# Patient Record
Sex: MALE | Race: WHITE | Employment: FULL TIME | URBAN - METROPOLITAN AREA
[De-identification: names, ages, dates, MRNs, and addresses within clinical notes are randomized per-mention and may not be internally consistent; named-entity substitution may affect disease eponyms.]

---

## 2017-05-26 ENCOUNTER — OFFICE VISIT (OUTPATIENT)
Dept: URGENT CARE | Facility: RETAIL CLINIC | Age: 22
End: 2017-05-26
Payer: COMMERCIAL

## 2017-05-26 VITALS — DIASTOLIC BLOOD PRESSURE: 69 MMHG | HEART RATE: 71 BPM | SYSTOLIC BLOOD PRESSURE: 130 MMHG | TEMPERATURE: 98.4 F

## 2017-05-26 DIAGNOSIS — L23.7 CONTACT DERMATITIS DUE TO POISON IVY: Primary | ICD-10-CM

## 2017-05-26 PROCEDURE — 99202 OFFICE O/P NEW SF 15 MIN: CPT | Performed by: NURSE PRACTITIONER

## 2017-05-26 RX ORDER — PREDNISONE 20 MG/1
TABLET ORAL
Qty: 21 TABLET | Refills: 0 | Status: SHIPPED | OUTPATIENT
Start: 2017-05-26 | End: 2021-08-11

## 2017-05-26 RX ORDER — TRIAMCINOLONE ACETONIDE 1 MG/G
OINTMENT TOPICAL
Qty: 80 G | Refills: 0 | Status: SHIPPED | OUTPATIENT
Start: 2017-05-26 | End: 2021-08-11

## 2017-05-26 NOTE — MR AVS SNAPSHOT
"              After Visit Summary   2017    Lawrence Engel    MRN: [id number]           Patient Information     Date Of Birth          1995        Visit Information        Provider Department      2017 4:30 PM Bhaskar Estrada APRN [facility]        Today's Diagnoses     Contact dermatitis due to poison ivy    -  1       Follow-ups after your visit        Who to contact     You can reach your care team any time of the day by calling [phone].  Notification of test results:  If you have an abnormal lab result, we will notify you by phone as soon as possible.         Additional Information About Your Visit        MyChart Information     [employer] lets you send messages to your doctor, view your test results, renew your prescriptions, schedule appointments and more. To sign up, go to [email or web address] . Click on \"Log in\" on the left side of the screen, which will take you to the Welcome page. Then click on \"Sign up Now\" on the right side of the page.     You will be asked to enter the access code listed below, as well as some personal information. Please follow the directions to create your username and password.     Your access code is: [email or web address]  Expires: 2017  4:45 PM     Your access code will  in 90 days. If you need help or a new code, please call your [place] clinic or [phone].        Care EveryWhere ID     This is your [facility] EveryWhere ID. This could be used by other organizations to access your [place] medical records  [id number]        Your Vitals Were     Pulse Temperature                71 98.4  F (36.9  C) (Oral)           Blood Pressure from Last 3 Encounters:   17 130/69   03/11/15 118/69    Weight from Last 3 Encounters:   03/11/15 161 lb (73 kg) (59 %)*     * Growth percentiles are based on CDC 2-20 Years data.              Today, you had the following     No orders found for display         Today's Medication Changes        "   These changes are accurate as of: 5/26/17  4:45 PM.  If you have any questions, ask your nurse or doctor.               Start taking these medicines.        Dose/Directions    predniSONE 20 MG tablet   Commonly known as:  DELTASONE   Used for:  Contact dermatitis due to poison ivy        Take 3 tablets for 3 days, then take 2 tablets for 3 days, then 1 tablet for 4 days, then 1/2 a tablet for 4 days.   Quantity:  21 tablet   Refills:  0       triamcinolone 0.1 % ointment   Commonly known as:  KENALOG   Used for:  Contact dermatitis due to poison ivy        Apply sparingly to affected area three times daily for 14 days.   Quantity:  80 g   Refills:  0            Where to get your medicines      These medications were sent to [facility] - [address]  [address], [facility] [address]     Phone:  [phone]     predniSONE 20 MG tablet         Some of these will need a paper prescription and others can be bought over the counter.  Ask your nurse if you have questions.     Bring a paper prescription for each of these medications     triamcinolone 0.1 % ointment                Primary Care Provider    None Specified       No primary provider on file.        Thank you!     Thank you for choosing [facility]  for your care. Our goal is always to provide you with excellent care. Hearing back from our patients is one way we can continue to improve our services. Please take a few minutes to complete the written survey that you may receive in the mail after your visit with us. Thank you!             Your Updated Medication List - Protect others around you: Learn how to safely use, store and throw away your medicines at www.disposemymeds.org.          This list is accurate as of: 5/26/17  4:45 PM.  Always use your most recent med list.                   Brand Name Dispense Instructions for use    predniSONE 20 MG tablet    DELTASONE    21 tablet    Take 3 tablets for 3 days, then  take 2 tablets for 3 days, then 1 tablet for 4 days, then 1/2 a tablet for 4 days.       triamcinolone 0.1 % ointment    KENALOG    80 g    Apply sparingly to affected area three times daily for 14 days.

## 2017-05-26 NOTE — PROGRESS NOTES
"[facility] clinic note    SUBJECTIVE:  Lawrence Engel is a 21 year old male who presents to [facility] clinic with chief complaint of a rash.  Onset of rash was 1 week(s) ago.   Rash is gradual onset and worsening.  Location of the rash: arms, and legs \"it is michelle every where\".  Quality/symptoms of rash: itching and redness   Symptoms are moderate and rash seems to be worsening.  Previous history of a similar rash? No  Recent exposure history: none known    Associated symptoms include: nothing.    No current outpatient prescriptions on file.     No current facility-administered medications for this visit.        PAST MEDICAL HISTORY: No past medical history on file.    PAST SURGICAL HISTORY: No past surgical history on file.    FAMILY HISTORY:   Family History   Problem Relation Age of Onset     DIABETES No family hx of      Coronary Artery Disease No family hx of      Hypertension No family hx of      Hyperlipidemia No family hx of      Breast Cancer No family hx of      Cancer - colorectal No family hx of      Ovarian Cancer No family hx of      Prostate Cancer No family hx of      Depression/Anxiety No family hx of      CEREBROVASCULAR DISEASE No family hx of      Anesthesia Reaction No family hx of      Thyroid Disease No family hx of      Asthma No family hx of      OSTEOPOROSIS No family hx of      Chemical Addiction No family hx of      Known Genetic Syndrome No family hx of        SOCIAL HISTORY:   Social History   Substance Use Topics     Smoking status: Never Smoker     Smokeless tobacco: Current User     Types: Chew      Comment: occasional     Alcohol use No         ROS:  Review of systems negative except as stated above.    EXAM:   Vitals:    05/26/17 1621   BP: 130/69   BP Location: Left arm   Pulse: 71   Temp: 98.4  F (36.9  C)   TempSrc: Oral     GENERAL: alert, no acute distress.  SKIN: Rash description:    Distribution: generalized  Location: scattered over " the arms & legs spot on torso.    Color: red,  Lesion type: macular, blotchy with no other findings  GENERAL APPEARANCE: healthy, alert and no distress  EYES: EOMI,  PERRL, conjunctiva clear  NECK: supple, non-tender to palpation, no adenopathy noted  NEURO: Normal strength and tone, sensory exam grossly normal,  normal speech and mentation    ASSESSMENT:  Contact dermatitis due to poison ivy      PLAN:  Current Outpatient Prescriptions   Medication     predniSONE (DELTASONE) 20 MG tablet     triamcinolone (KENALOG) 0.1 % ointment     No current facility-administered medications for this visit.        Treatment of pruritus can be difficult and often frustrating. Specific treatments exist for some, but not all.  Antihistamines are the most widely utilized agents for pruritus. (such as Benadryl & Zyrtec).  Appropriate skin care is imperative.  Avoidance of scratching, and therefore secondary skin irritation and perpetuation of the itch-scratch cycle, is also important.    Poison ivy is a severe skin irritant that stimulates the immune system. Contact sensitivity is due to the urushiols, which bind to skin proteins, sensitizing the individual. Once sensitized, re-exposure leads to allergic reactions.  Symptoms of poison ivy dermatitis in sensitized individuals generally develop within 4 to 96 hours after exposure and peak between 1 to 14 days after exposure.   New lesions can present up to 21 days after exposure in previously unexposed individuals   Left untreated, dermatitis usually resolves in one to three weeks.   Common complication of poison ivy dermatitis is secondary bacterial infection-skin.  The most important and effective treatment for poison ivy dermatitis is identification and avoidance of toxic plants.  Discussed immediate aftercare & avoidance with Lawrence Tiegs   To prevent poison ivy from causing skin irritation, wash exposed area with water within 5 to 10 minutes. Use soap and water first, then ether or  alcohol. Washing even two hours after exposure significantly reduces the severity of dermatitis.  Fingernails should be washed carefully to remove resin that may remain under the nails.  Treatment for dermatitis can include topical symptomatic therapies such as oatmeal baths and cool compresses as well as OTC options as discussed.    Bhaskar Estrada MSN, APRN, Family NP-C  [facility]

## 2017-05-26 NOTE — NURSING NOTE
"Chief Complaint   Patient presents with     Derm Problem     red itchy rash on right upper arm, some on left arm and legs since last saturday, rash is spreading, no fevers       Initial /69 (BP Location: Left arm)  Pulse 71  Temp 98.4  F (36.9  C) (Oral) Estimated body mass index is 25.22 kg/(m^2) as calculated from the following:    Height as of 3/11/15: 5' 7\" (1.702 m).    Weight as of 3/11/15: 161 lb (73 kg).  Medication Reconciliation: complete    "

## 2019-11-08 ENCOUNTER — IMMUNIZATION (OUTPATIENT)
Dept: FAMILY MEDICINE | Facility: CLINIC | Age: 24
End: 2019-11-08
Payer: COMMERCIAL

## 2019-11-08 PROCEDURE — 90686 IIV4 VACC NO PRSV 0.5 ML IM: CPT

## 2019-11-08 PROCEDURE — 90471 IMMUNIZATION ADMIN: CPT

## 2019-12-24 ENCOUNTER — TELEPHONE (OUTPATIENT)
Dept: FAMILY MEDICINE | Facility: OTHER | Age: 24
End: 2019-12-24

## 2019-12-24 DIAGNOSIS — Z20.828 EXPOSURE TO INFLUENZA: Primary | ICD-10-CM

## 2019-12-24 RX ORDER — OSELTAMIVIR PHOSPHATE 75 MG/1
75 CAPSULE ORAL DAILY
Qty: 10 CAPSULE | Refills: 0 | Status: SHIPPED | OUTPATIENT
Start: 2019-12-24 | End: 2020-01-03

## 2019-12-24 NOTE — TELEPHONE ENCOUNTER
Patient girlfriend with Influenza B. They have a 5 month old baby.       Myles Rodriguez MA on 12/24/2019 at 10:34 AM

## 2020-04-21 ENCOUNTER — OFFICE VISIT (OUTPATIENT)
Dept: FAMILY MEDICINE | Facility: CLINIC | Age: 25
End: 2020-04-21
Payer: COMMERCIAL

## 2020-04-21 ENCOUNTER — VIRTUAL VISIT (OUTPATIENT)
Dept: FAMILY MEDICINE | Facility: OTHER | Age: 25
End: 2020-04-21

## 2020-04-21 VITALS
OXYGEN SATURATION: 99 % | DIASTOLIC BLOOD PRESSURE: 78 MMHG | HEART RATE: 102 BPM | SYSTOLIC BLOOD PRESSURE: 130 MMHG | BODY MASS INDEX: 27.65 KG/M2 | RESPIRATION RATE: 18 BRPM | TEMPERATURE: 97.5 F | HEIGHT: 68 IN | WEIGHT: 182.4 LBS

## 2020-04-21 DIAGNOSIS — L03.113 CELLULITIS OF RIGHT UPPER EXTREMITY: Primary | ICD-10-CM

## 2020-04-21 PROCEDURE — 99213 OFFICE O/P EST LOW 20 MIN: CPT | Performed by: NURSE PRACTITIONER

## 2020-04-21 RX ORDER — CEPHALEXIN 500 MG/1
500 CAPSULE ORAL 2 TIMES DAILY
Qty: 20 CAPSULE | Refills: 0 | Status: SHIPPED | OUTPATIENT
Start: 2020-04-21 | End: 2020-05-01

## 2020-04-21 ASSESSMENT — MIFFLIN-ST. JEOR: SCORE: 1791.86

## 2020-04-21 ASSESSMENT — PAIN SCALES - GENERAL: PAINLEVEL: MODERATE PAIN (5)

## 2020-04-21 NOTE — PROGRESS NOTES
"Date: 2020 09:38:59  Clinician: Kai Rodríguez  Clinician NPI: [id number]  Patient: Lawrence Engel  Patient : 1995  Patient Address: [address] [place] [address]  Patient Phone: [phone]  Visit Protocol: General skin conditions  Patient Summary:  Lawrence is a 24 year old ( : 1995 ) male who initiated a Visit for evaluation of an unspecified skin condition. When asked the question \"Please sign me up to receive news, health information and promotions. \", Lawrence responded \"No\".    Images of his skin condition were uploaded.  His symptoms started 1-3 days ago and affect the right side of his body. The skin condition is located on his elbows. The skin condition is red in color.   The affected area has scabs and sores. It feels warm to touch, burning, tender to touch, and painful. The symptoms interfere with his sleep.   Symptom details     Redness: The redness has not rapidly increased in size.    Pain: The pain is severe (between 7-9 on a 10 point pain scale).     The skin condition has changed since the symptoms started. Description of changes as reported by the patient (free text): It has gradually become bigger in size   Denied symptoms include hives, itchiness, drainage, crusts, blisters, pimples, numbness, and dry/flaky skin. Lawrence does not feel feverish. He does not have a rash in the shape of a bull's-eye.   Treatments or home remedies used to relieve the symptoms as reported by the patient (free text): Tylenol, ice, antibiotic cream   Precipitating events   Lawrence did not come in contact with any irritants prior to the onset of his symptoms and has not been in close contact with anyone that has similar symptoms. He also did not spend time in a wooded area, swim, travel, or spend excess time in the sun just before his symptoms started. Lawrence is not sure if he was bitten or stung by an insect.   Pertinent medical history  Lawrence has not experienced this skin condition before.   Lawrence has had " chickenpox, but has not had shingles in the past.   Lawrence has a history of eczema.   Ongoing medical conditions were denied.   Lawrence does not need a return to work/school note.   Weight: 182 lbs   Lawrence does not smoke or use smokeless tobacco.   Weight: 182 lbs    MEDICATIONS: No current medications, ALLERGIES: NKDA  Clinician Response:  Dear Lawrence,   Your health is our priority. To determine the most appropriate care for you, I would like you to be seen in person to further discuss your health history and symptoms.  You will not be charged for this Visit. Thank you for trusting us with your care.   Diagnosis: Refer for additional evaluation  Diagnosis ICD: R69

## 2020-04-21 NOTE — PROGRESS NOTES
Subjective     Lawrence Engel is a 24 year old male who presents to clinic today for the following health issues:    Patient presents today with red, swollen painful elbow. Patient has history of ezcema and did has have a chronic rash on both elbows. Woke up last night with elbow swollen and painful. He does have an open sore from where he has been scratching. No other acute symptoms to include fever, or chills. No SOB or chest pain. Mood is stable. He works as a .     HPI   Chief Complaint   Patient presents with     Derm Problem     on elbow right            There is no problem list on file for this patient.    History reviewed. No pertinent surgical history.    Social History     Tobacco Use     Smoking status: Never Smoker     Smokeless tobacco: Current User     Types: Chew     Tobacco comment: occasional   Substance Use Topics     Alcohol use: No     Family History   Problem Relation Age of Onset     Diabetes No family hx of      Coronary Artery Disease No family hx of      Hypertension No family hx of      Hyperlipidemia No family hx of      Breast Cancer No family hx of      Cancer - colorectal No family hx of      Ovarian Cancer No family hx of      Prostate Cancer No family hx of      Depression/Anxiety No family hx of      Cerebrovascular Disease No family hx of      Anesthesia Reaction No family hx of      Thyroid Disease No family hx of      Asthma No family hx of      Osteoporosis No family hx of      Chemical Addiction No family hx of      Known Genetic Syndrome No family hx of          Current Outpatient Medications   Medication Sig Dispense Refill     cephALEXin (KEFLEX) 500 MG capsule Take 1 capsule (500 mg) by mouth 2 times daily for 10 days 20 capsule 0     predniSONE (DELTASONE) 20 MG tablet Take 3 tablets for 3 days, then take 2 tablets for 3 days, then 1 tablet for 4 days, then 1/2 a tablet for 4 days. (Patient not taking: Reported on 4/21/2020) 21 tablet 0     triamcinolone (KENALOG) 0.1 %  "ointment Apply sparingly to affected area three times daily for 14 days. (Patient not taking: Reported on 4/21/2020) 80 g 0     No Known Allergies      Reviewed and updated as needed this visit by Provider  Tobacco  Allergies  Meds  Problems  Med Hx  Surg Hx  Fam Hx         Review of Systems   ROS COMP: Constitutional, HEENT, cardiovascular, pulmonary, gi and gu systems are negative, except as otherwise noted.      Objective    /78   Pulse 102   Temp 97.5  F (36.4  C) (Temporal)   Resp 18   Ht 1.727 m (5' 8\")   Wt 82.7 kg (182 lb 6.4 oz)   SpO2 99%   BMI 27.73 kg/m    Body mass index is 27.73 kg/m .  Physical Exam  Vitals signs reviewed.   Constitutional:       Appearance: Normal appearance.   HENT:      Head: Normocephalic and atraumatic.      Nose: Nose normal.      Mouth/Throat:      Mouth: Mucous membranes are moist.   Eyes:      Extraocular Movements: Extraocular movements intact.   Neck:      Musculoskeletal: Normal range of motion.   Cardiovascular:      Rate and Rhythm: Normal rate.   Pulmonary:      Effort: Pulmonary effort is normal.   Skin:     General: Skin is warm and dry.      Findings: Erythema and rash present.             Comments: Right Elbow, red indurated, warm to touch, open lesion with yellow base in center of induration.    Neurological:      General: No focal deficit present.      Mental Status: He is alert and oriented to person, place, and time.   Psychiatric:         Mood and Affect: Mood normal.         Thought Content: Thought content normal.         Judgment: Judgment normal.              Assessment & Plan     1. Cellulitis of right upper extremity  - Will treat with course of Keflex and topical antibiotic.  - Specific home care instructions provided.   - cephALEXin (KEFLEX) 500 MG capsule; Take 1 capsule (500 mg) by mouth 2 times daily for 10 days  Dispense: 20 capsule; Refill: 0    - Patient instructed to present to clinic or ER if symptoms get worse or do not " improve.     See AVS    Return in about 2 weeks (around 5/5/2020), or if symptoms worsen or fail to improve, for Recheck if symptoms worsen or fail to improve.    Nomi Gonzalez NP  [facility]

## 2020-04-21 NOTE — PATIENT INSTRUCTIONS
Patient Education     Discharge Instructions for Cellulitis  You have been diagnosed with cellulitis. This is an infection in the deepest layer of the skin and tissue beneath the skin. In some cases, the infection also affects the muscle. Cellulitis is caused by bacteria. The bacteria can enter the body through broken skin. This can happen with a cut, scratch, animal bite, or an insect bite that has been scratched. You may have been treated in the hospital with antibiotics and fluids. You will likely be given a prescription for antibiotics to take at home. This sheet will help you take care of yourself at home.  Home care  When you are home:    Take the prescribed antibiotic medicine you are given as directed until it is gone. Take it even if you feel better. It treats the infection and stops it from returning. Not taking all the medicine can make future infections hard to treat.    Keep the infected area clean.    When possible, raise the infected area above the level of your heart. This helps keep swelling down.    Talk with your healthcare provider if you are in pain. Ask what kind of over-the-counter medicine you can take for pain.    Apply clean bandages as advised.    Take your temperature once a day for a week.    Wash your hands often to prevent spreading the infection.  In the future, wash your hands before and after you touch cuts, scratches, or bandages. This will help prevent infection.   When to call your healthcare provider  Call your healthcare provider right away if you have any of the following:    Trouble or pain when moving the joints above or below the infected area    Discharge or pus draining from the area    Fever of 100.4 F (38 C) or higher, or as directed by your healthcare provider    Pain that gets worse in or around the infected     Redness that gets worse in or around the infected area, particularly if the area of redness expands to a wider area    Shaking chills    Swelling of the  infected area    Vomiting  Date Last Reviewed: 8/1/2016 2000-2019 [employer]. [address] [place] [address], [place] [address]. All rights reserved. This information is not intended as a substitute for professional medical care. Always follow your healthcare professional's instructions.           Patient Education     Cellulitis  Cellulitis is an infection of the deep layers of skin. A break in the skin, such as a cut or scratch, can let bacteria under the skin. If the bacteria get to deep layers of the skin, it can be serious. If not treated, cellulitis can get into the bloodstream and lymph nodes. The infection can then spread throughout the body. This causes serious illness.  Cellulitis causes the affected skin to become red, swollen, warm, and sore. The reddened areas have a visible border. An open sore may leak fluid (pus). You may have a fever, chills, and pain.  Cellulitis is treated with antibiotics taken for 7 to 10 days. An open sore may be cleaned and covered with cool wet gauze. Symptoms should get better 1 to 2 days after treatment is started. Make sure to take all the antibiotics for the full number of days until they are gone. Keep taking the medicine even if your symptoms go away.  Home care  Follow these tips:    Limit the use of the part of your body with cellulitis.     If the infection is on your leg, keep your leg raised while sitting. This will help to reduce swelling.    Take all of the antibiotic medicine exactly as directed until it is gone. Do not miss any doses, especially during the first 7 days. Don t stop taking the medicine when your symptoms get better.    Keep the affected area clean and dry.    Wash your hands with soap and warm water before and after touching your skin. Anyone else who touches your skin should also wash his or her hands. Don't share towels.  Follow-up care  Follow up with your healthcare provider, or as advised. If your infection does not go away on the  first antibiotic, your healthcare provider will prescribe a different one.  When to seek medical advice  Call your healthcare provider right away if any of these occur:    Red areas that spread    Swelling or pain that gets worse    Fluid leaking from the skin (pus)    Fever higher of 100.4  F (38.0  C) or higher after 2 days on antibiotics  Date Last Reviewed: 9/1/2016 2000-2019 [employer]. [address] [place] [address]. All rights reserved. This information is not intended as a substitute for professional medical care. Always follow your healthcare professional's instructions.         Wound care Right Elbow:    1. For the next 10 days take 500 mg of Keflex, orally, 2 times daily, take with food.     2. Soak elbow nightly with warm water with epson salt. Dry then cover open area with bacitracin, cover rest of elbow Cerve lotion,  Cover with long sleeve t-shirt.    3. For Work clean with warm water: Cover open area with bacitracin, then wrap with gauze and Coban wrap.     Call clinic with new or worsening infection in the next week.     Nomi Gonzalez CNP

## 2021-08-10 NOTE — PROGRESS NOTES
Lawrence is a 26 year old who is being evaluated via a billable telephone visit.      What phone number would you like to be contacted at? [phone]  How would you like to obtain your AVS? Mail a copy    Assessment & Plan     Right elbow pain  - Patient having pain, redness and warmth at the right elbow. I discussed with him that I would recommend that he be seen as I do not feel comfortable treating this over the phone as it could possibly be skin infection, gout, vs eczema flare. Hard to know over the phone. Patient will go into urgent care and I will have one of our nurses call him to inform him that as unfortunately we do not have any openings today.     Redness  - As noted above.         The patient indicates understanding of these issues and agrees with the plan.    There are no Patient Instructions on file for this visit.    No follow-ups on file.    MCKENNA Ruff CNP  [facility]    Sriram Bravo is a 26 year old who presents for the following health issues     HPI     Musculoskeletal problem/pain  Onset/Duration: Yesterday  Description  Location: elbow - right  Joint Swelling: no  Redness: YES- has eczema and noticeably red then natural skin.   Pain: YES-The site is painful  Warmth: YES  Intensity:  moderate, severe  Progression of Symptoms:  same  Accompanying signs and symptoms:   Fevers: no  Numbness/tingling/weakness: no  History  Trauma to the area: YES- bumped it  Recent illness:  no  Previous similar problem: YES  Previous evaluation:  YES, last year  Precipitating or alleviating factors:  Aggravating factors include: lifting, overuse and anything that bumps it  Therapies tried and outcome: rest/inactivity, heat, ice and acetaminophen        Review of Systems         Objective           Vitals:  No vitals were obtained today due to virtual visit.    Physical Exam   healthy, alert and no distress  PSYCH: Alert and oriented times 3; coherent speech, normal   rate and  volume, able to articulate logical thoughts, able   to abstract reason, no tangential thoughts, no hallucinations   or delusions  His affect is normal    Remainder of exam unable to be completed due to telephone visits    Diagnostic: None     Phone call duration: 5 minutes

## 2021-08-11 ENCOUNTER — VIRTUAL VISIT (OUTPATIENT)
Dept: FAMILY MEDICINE | Facility: OTHER | Age: 26
End: 2021-08-11
Payer: COMMERCIAL

## 2021-08-11 ENCOUNTER — TELEPHONE (OUTPATIENT)
Dept: FAMILY MEDICINE | Facility: OTHER | Age: 26
End: 2021-08-11

## 2021-08-11 ENCOUNTER — HOSPITAL ENCOUNTER (EMERGENCY)
Facility: CLINIC | Age: 26
Discharge: HOME | End: 2021-08-12
Attending: FAMILY MEDICINE | Admitting: FAMILY MEDICINE
Payer: COMMERCIAL

## 2021-08-11 VITALS
SYSTOLIC BLOOD PRESSURE: 124 MMHG | HEART RATE: 86 BPM | DIASTOLIC BLOOD PRESSURE: 71 MMHG | TEMPERATURE: 98.9 F | WEIGHT: 188 LBS | OXYGEN SATURATION: 100 % | BODY MASS INDEX: 28.59 KG/M2 | RESPIRATION RATE: 18 BRPM

## 2021-08-11 DIAGNOSIS — M25.521 RIGHT ELBOW PAIN: Primary | ICD-10-CM

## 2021-08-11 DIAGNOSIS — L53.9 REDNESS: ICD-10-CM

## 2021-08-11 DIAGNOSIS — L03.113 CELLULITIS OF RIGHT UPPER EXTREMITY: ICD-10-CM

## 2021-08-11 LAB
BASOPHILS # BLD AUTO: 0 10E3/UL (ref 0–0.2)
BASOPHILS NFR BLD AUTO: 1 %
CRP SERPL-MCNC: 37.6 MG/L (ref 0–8)
EOSINOPHIL # BLD AUTO: 0.1 10E3/UL (ref 0–0.7)
EOSINOPHIL NFR BLD AUTO: 1 %
ERYTHROCYTE [DISTWIDTH] IN BLOOD BY AUTOMATED COUNT: 11.7 % (ref 10–15)
HCT VFR BLD AUTO: 43.7 % (ref 40–53)
HGB BLD-MCNC: 15.5 G/DL (ref 13.3–17.7)
IMM GRANULOCYTES # BLD: 0 10E3/UL
IMM GRANULOCYTES NFR BLD: 0 %
LYMPHOCYTES # BLD AUTO: 2 10E3/UL (ref 0.8–5.3)
LYMPHOCYTES NFR BLD AUTO: 24 %
MCH RBC QN AUTO: 30.8 PG (ref 26.5–33)
MCHC RBC AUTO-ENTMCNC: 35.5 G/DL (ref 31.5–36.5)
MCV RBC AUTO: 87 FL (ref 78–100)
MONOCYTES # BLD AUTO: 1.2 10E3/UL (ref 0–1.3)
MONOCYTES NFR BLD AUTO: 14 %
NEUTROPHILS # BLD AUTO: 5.2 10E3/UL (ref 1.6–8.3)
NEUTROPHILS NFR BLD AUTO: 60 %
NRBC # BLD AUTO: 0 10E3/UL
NRBC BLD AUTO-RTO: 0 /100
PLATELET # BLD AUTO: 195 10E3/UL (ref 150–450)
RBC # BLD AUTO: 5.03 10E6/UL (ref 4.4–5.9)
WBC # BLD AUTO: 8.5 10E3/UL (ref 4–11)

## 2021-08-11 PROCEDURE — 250N000011 HC RX IP 250 OP 636: Performed by: FAMILY MEDICINE

## 2021-08-11 PROCEDURE — 96365 THER/PROPH/DIAG IV INF INIT: CPT | Performed by: FAMILY MEDICINE

## 2021-08-11 PROCEDURE — 99207 PR NON-BILLABLE SERV PER CHARTING: CPT | Mod: 95 | Performed by: NURSE PRACTITIONER

## 2021-08-11 PROCEDURE — 99284 EMERGENCY DEPT VISIT MOD MDM: CPT | Mod: 25 | Performed by: FAMILY MEDICINE

## 2021-08-11 PROCEDURE — 36415 COLL VENOUS BLD VENIPUNCTURE: CPT | Performed by: FAMILY MEDICINE

## 2021-08-11 PROCEDURE — 86140 C-REACTIVE PROTEIN: CPT | Performed by: FAMILY MEDICINE

## 2021-08-11 PROCEDURE — 85004 AUTOMATED DIFF WBC COUNT: CPT | Performed by: FAMILY MEDICINE

## 2021-08-11 PROCEDURE — 99284 EMERGENCY DEPT VISIT MOD MDM: CPT | Performed by: FAMILY MEDICINE

## 2021-08-11 RX ORDER — CLINDAMYCIN HCL 300 MG
300 CAPSULE ORAL 4 TIMES DAILY
Qty: 30 CAPSULE | Refills: 0 | Status: SHIPPED | OUTPATIENT
Start: 2021-08-11 | End: 2021-08-25

## 2021-08-11 RX ORDER — CEPHALEXIN 500 MG/1
500 CAPSULE ORAL
COMMUNITY
Start: 2021-08-11 | End: 2021-08-21

## 2021-08-11 RX ORDER — CLINDAMYCIN PHOSPHATE 900 MG/50ML
900 INJECTION, SOLUTION INTRAVENOUS EVERY 8 HOURS
Status: DISCONTINUED | OUTPATIENT
Start: 2021-08-11 | End: 2021-08-12 | Stop reason: HOSPADM

## 2021-08-11 RX ADMIN — CLINDAMYCIN PHOSPHATE 900 MG: 900 INJECTION, SOLUTION INTRAVENOUS at 22:57

## 2021-08-11 NOTE — TELEPHONE ENCOUNTER
Please triage Cellulitis, recommend F2F or ER/urgent care depending on RN findings.       MCKENNA Ruff CNP  Questions or concerns please feel free to send me a [employer] message or call me  Phone : [phone]

## 2021-08-11 NOTE — TELEPHONE ENCOUNTER
Per KV visit with patient she feels he needs to be seen in person.     Patient notified of no openings and will go into UC today.     AILYN [place]om[place] kathya Hamilton

## 2021-08-11 NOTE — TELEPHONE ENCOUNTER
Right elbow was bumped yesterday and it's sore. This may have triggered it. Red circled area around the elbow. Has some scabbed over eczema in same area. Pain at 4-5/10., this morning more 7/10 before Tylenol/IB meds. Elbow in pain when touched on anything. No drainage, swelling, or fevers.     Last year was put on antibiotic that worked well. Provider told him if this happened again that he could be put on same antibiotic which is why he is just doing virtual visit.     Routing to provider to see if patient can still be seen.    AILYN Palomino/[facility]

## 2021-08-12 NOTE — ED PROVIDER NOTES
History     Chief Complaint   Patient presents with     Cellulitis     HPI  Lawrence Engel is a 26 year old male who presents with concerns of increasing redness and no streaking from his right elbow.  Patient was seen earlier today and diagnosed with a cellulitis of the elbow.  Patient was discharged on Keflex.  Patient's had 4 doses now and is concerned because noticing some redness spreading up into his upper arm.  Denies any fevers or chills.  States that the pain is not significantly worse.  Has not noticed any new swelling or drainage from his elbow.  Touching the area makes the pain worse, nothing really helps.    Allergies:  No Known Allergies    Problem List:    There are no problems to display for this patient.       Past Medical History:    History reviewed. No pertinent past medical history.    Past Surgical History:    History reviewed. No pertinent surgical history.    Family History:    Family History   Problem Relation Age of Onset     Diabetes No family hx of      Coronary Artery Disease No family hx of      Hypertension No family hx of      Hyperlipidemia No family hx of      Breast Cancer No family hx of      Cancer - colorectal No family hx of      Ovarian Cancer No family hx of      Prostate Cancer No family hx of      Depression/Anxiety No family hx of      Cerebrovascular Disease No family hx of      Anesthesia Reaction No family hx of      Thyroid Disease No family hx of      Asthma No family hx of      Osteoporosis No family hx of      Chemical Addiction No family hx of      Known Genetic Syndrome No family hx of        Social History:  Marital Status:  Single [1]  Social History     Tobacco Use     Smoking status: Never Smoker     Smokeless tobacco: Current User     Types: Chew     Tobacco comment: occasional   Vaping Use     Vaping Use: Never used   Substance Use Topics     Alcohol use: Yes     Drug use: No        Medications:    cephALEXin (KEFLEX) 500 MG capsule          Review of  Systems   All other systems reviewed and are negative.      Physical Exam   BP: (!) 143/94  Pulse: 92  Temp: 98.9  F (37.2  C)  Resp: 18  Weight: 85.3 kg (188 lb)  SpO2: 100 %      Physical Exam  Vitals and nursing note reviewed.   Constitutional:       General: He is not in acute distress.     Appearance: Normal appearance. He is not ill-appearing.   Musculoskeletal:      Right upper arm: Tenderness present.        Arms:    Skin:     Findings: Erythema and rash present.   Neurological:      Mental Status: He is alert.         ED Course        Procedures      Results for orders placed or performed during the hospital encounter of 08/11/21 (from the past 24 hour(s))   CBC with platelets differential    Narrative    The following orders were created for panel order CBC with platelets differential.  Procedure                               Abnormality         Status                     ---------                               -----------         ------                     CBC with platelets and d...[[id number]]                      Final result                 Please view results for these tests on the individual orders.   CRP inflammation   Result Value Ref Range    CRP Inflammation 37.6 (H) 0.0 - 8.0 mg/L   CBC with platelets and differential   Result Value Ref Range    WBC Count 8.5 4.0 - 11.0 10e3/uL    RBC Count 5.03 4.40 - 5.90 10e6/uL    Hemoglobin 15.5 13.3 - 17.7 g/dL    Hematocrit 43.7 40.0 - 53.0 %    MCV 87 78 - 100 fL    MCH 30.8 26.5 - 33.0 pg    MCHC 35.5 31.5 - 36.5 g/dL    RDW 11.7 10.0 - 15.0 %    Platelet Count 195 150 - 450 10e3/uL    % Neutrophils 60 %    % Lymphocytes 24 %    % Monocytes 14 %    % Eosinophils 1 %    % Basophils 1 %    % Immature Granulocytes 0 %    NRBCs per 100 WBC 0 <1 /100    Absolute Neutrophils 5.2 1.6 - 8.3 10e3/uL    Absolute Lymphocytes 2.0 0.8 - 5.3 10e3/uL    Absolute Monocytes 1.2 0.0 - 1.3 10e3/uL    Absolute Eosinophils 0.1 0.0 - 0.7 10e3/uL    Absolute Basophils 0.0 0.0  - 0.2 10e3/uL    Absolute Immature Granulocytes 0.0 <=0.0 10e3/uL    Absolute NRBCs 0.0 10e3/uL       Medications   clindamycin (CLEOCIN) infusion 900 mg (has no administration in time range)     Labs are back and are mostly unremarkable.  Patient does have a slightly elevated CRP but a normal white count.  Patient does not have a fever here.  I am concerned with now the red streaking for possible treatment failure with just the Keflex.  We will go ahead and add clindamycin for MRSA coverage and more broader coverage.  We will marked the area and patient will follow up in the next 1 to 2 days for recheck.  I do not think patient's had complete treatment failure yet and does not need to be admitted for IV antibiotics at this point.  Also of note the patient's elbow was not fluctuant, there is no indication for incision and drainage at this time.    Assessments & Plan (with Medical Decision Making)  Cellulitis right arm     I have reviewed the nursing notes.    I have reviewed the findings, diagnosis, plan and need for follow up with the patient.              8/11/2021   [facility] EMERGENCY DEPT     Clyde Randolph MD  08/11/21 2953

## 2021-08-12 NOTE — ED TRIAGE NOTES
RUE redness noted and seen in UC cellulitis, started abx today 'full dose, 4 tabs' and tonight noted streak up arm .

## 2021-08-25 ENCOUNTER — OFFICE VISIT (OUTPATIENT)
Dept: FAMILY MEDICINE | Facility: OTHER | Age: 26
End: 2021-08-25
Payer: COMMERCIAL

## 2021-08-25 ENCOUNTER — ANCILLARY PROCEDURE (OUTPATIENT)
Dept: GENERAL RADIOLOGY | Facility: OTHER | Age: 26
End: 2021-08-25
Attending: FAMILY MEDICINE
Payer: COMMERCIAL

## 2021-08-25 VITALS
SYSTOLIC BLOOD PRESSURE: 112 MMHG | OXYGEN SATURATION: 96 % | BODY MASS INDEX: 28.79 KG/M2 | WEIGHT: 190 LBS | DIASTOLIC BLOOD PRESSURE: 70 MMHG | HEIGHT: 68 IN | HEART RATE: 75 BPM | RESPIRATION RATE: 18 BRPM | TEMPERATURE: 97.8 F

## 2021-08-25 DIAGNOSIS — Z11.4 SCREENING FOR HIV (HUMAN IMMUNODEFICIENCY VIRUS): ICD-10-CM

## 2021-08-25 DIAGNOSIS — Z23 NEED FOR VACCINATION: ICD-10-CM

## 2021-08-25 DIAGNOSIS — Z11.59 NEED FOR HEPATITIS C SCREENING TEST: ICD-10-CM

## 2021-08-25 DIAGNOSIS — M70.21 OLECRANON BURSITIS OF RIGHT ELBOW: ICD-10-CM

## 2021-08-25 DIAGNOSIS — M25.421 ELBOW SWELLING, RIGHT: Primary | ICD-10-CM

## 2021-08-25 DIAGNOSIS — M25.421 ELBOW SWELLING, RIGHT: ICD-10-CM

## 2021-08-25 PROCEDURE — 99215 OFFICE O/P EST HI 40 MIN: CPT | Mod: 25 | Performed by: FAMILY MEDICINE

## 2021-08-25 PROCEDURE — 73070 X-RAY EXAM OF ELBOW: CPT | Mod: RT | Performed by: RADIOLOGY

## 2021-08-25 PROCEDURE — 90471 IMMUNIZATION ADMIN: CPT | Performed by: FAMILY MEDICINE

## 2021-08-25 PROCEDURE — 87205 SMEAR GRAM STAIN: CPT | Performed by: FAMILY MEDICINE

## 2021-08-25 PROCEDURE — 87070 CULTURE OTHR SPECIMN AEROBIC: CPT | Performed by: FAMILY MEDICINE

## 2021-08-25 PROCEDURE — 90715 TDAP VACCINE 7 YRS/> IM: CPT | Performed by: FAMILY MEDICINE

## 2021-08-25 ASSESSMENT — PAIN SCALES - GENERAL: PAINLEVEL: NO PAIN (0)

## 2021-08-25 ASSESSMENT — MIFFLIN-ST. JEOR: SCORE: 1816.2

## 2021-08-25 NOTE — PROGRESS NOTES
"    Assessment & Plan       ICD-10-CM    1. Elbow swelling, right  M25.421 Aspirate Aerobic Bacterial Culture Routine with Gram Stain     CANCELED: XR Elbow Left 2 Views   2. Screening for HIV (human immunodeficiency virus)  Z11.4    3. Need for hepatitis C screening test  Z11.59    4. Need for vaccination  Z23       Exam today most closely looks like a bursitis.  Discussed nonsurgical management is priority.  He states he is wrapping at this with some effect but not completely resolved and would like drainage and potentially cultures to determine if there is something more to be done for this problem.  Patient was prepped in sterile fashion x-ray reviewed and determined to likely fluid in the subcutaneous tissues.  Skin was superficially numbed then the area was drained with a needle and sent for culture.    Review of the result(s) of each unique test - elbow xray  42 minutes spent on the date of the encounter doing chart review, history and exam, documentation and further activities per the note       Tobacco Cessation:   reports that he has never smoked. His smokeless tobacco use includes chew.      BMI:   Estimated body mass index is 28.9 kg/m  as calculated from the following:    Height as of this encounter: 1.727 m (5' 7.99\").    Weight as of this encounter: 86.2 kg (190 lb).     No follow-ups on file.    Veda Greene MD, MD  [facility] UNIQUE Bravo is a 26 year old who presents for the following health issues     History of Present Illness       He eats 0-1 servings of fruits and vegetables daily.He consumes 1 sweetened beverage(s) daily.He exercises with enough effort to increase his heart rate 60 or more minutes per day.  He exercises with enough effort to increase his heart rate 6 days per week.   He is taking medications regularly.       ED/ Followup:    Facility:  [facility]  Date of visit: " "8/11/2020  Reason for visit: Cellulitis of right upper extremity   Current Status: now has mass on right elbow            Review of Systems   Constitutional, HEENT, cardiovascular, pulmonary, GI, , musculoskeletal, neuro, skin, endocrine and psych systems are negative, except as otherwise noted.      Objective    /70   Pulse 75   Temp 97.8  F (36.6  C) (Temporal)   Resp 18   Ht 1.727 m (5' 7.99\")   Wt 86.2 kg (190 lb)   SpO2 96%   BMI 28.90 kg/m    Body mass index is 28.9 kg/m .  Physical Exam   GENERAL: healthy, alert and no distress  RESP: lungs clear to auscultation - no rales, rhonchi or wheezes  CV: regular rate and rhythm, normal S1 S2, no S3 or S4, no murmur, click or rub, no peripheral edema and peripheral pulses strong  MS: R elbow moves well and good strength, bursa area swelling noted without redness  SKIN: no suspicious lesions or rashes  NEURO: Normal strength and tone, mentation intact and speech normal  PSYCH: mentation appears normal, affect normal/bright        Elbow Xray - bursa area swelling        "

## 2021-08-26 NOTE — RESULT ENCOUNTER NOTE
No growth on the culture so far. Will continue to follow, though encourage conservative measures with wraps.  Veda Greene MD

## 2021-08-30 LAB
BACTERIA ASPIRATE CULT: NO GROWTH
GRAM STAIN RESULT: NORMAL
GRAM STAIN RESULT: NORMAL

## 2021-10-03 ENCOUNTER — HEALTH MAINTENANCE LETTER (OUTPATIENT)
Age: 26
End: 2021-10-03

## 2021-12-17 ENCOUNTER — APPOINTMENT (OUTPATIENT)
Dept: GENERAL RADIOLOGY | Facility: CLINIC | Age: 26
End: 2021-12-17
Attending: PHYSICIAN ASSISTANT
Payer: OTHER MISCELLANEOUS

## 2021-12-17 ENCOUNTER — HOSPITAL ENCOUNTER (EMERGENCY)
Facility: CLINIC | Age: 26
Discharge: HOME | End: 2021-12-17
Attending: PHYSICIAN ASSISTANT | Admitting: PHYSICIAN ASSISTANT
Payer: OTHER MISCELLANEOUS

## 2021-12-17 VITALS
OXYGEN SATURATION: 98 % | WEIGHT: 189 LBS | DIASTOLIC BLOOD PRESSURE: 86 MMHG | BODY MASS INDEX: 28.74 KG/M2 | SYSTOLIC BLOOD PRESSURE: 137 MMHG | TEMPERATURE: 98 F | RESPIRATION RATE: 16 BRPM | HEART RATE: 82 BPM

## 2021-12-17 DIAGNOSIS — S93.401A RIGHT ANKLE SPRAIN: ICD-10-CM

## 2021-12-17 PROCEDURE — 99282 EMERGENCY DEPT VISIT SF MDM: CPT | Performed by: PHYSICIAN ASSISTANT

## 2021-12-17 PROCEDURE — 99283 EMERGENCY DEPT VISIT LOW MDM: CPT | Performed by: PHYSICIAN ASSISTANT

## 2021-12-17 PROCEDURE — 73610 X-RAY EXAM OF ANKLE: CPT | Mod: RT

## 2021-12-17 NOTE — DISCHARGE INSTRUCTIONS
It was a pleasure working with you today!  I hope your condition improves rapidly!     Thankfully, your x-ray did not show any sign of fracture.  You did sprain a ligament in the outside of the right ankle.  Please rest and elevate your ankle today.  Ice the outside of your ankle for 15 minutes every 2-3 hours today.  Use the Ace bandage and ankle stirrup anytime that you are going to be bearing weight.  Wear the Ace bandage and ankle stirrup until you are able to bear weight and walk without any pain at all.  This can take anywhere from 1-4 weeks.

## 2021-12-18 NOTE — ED PROVIDER NOTES
History     Chief Complaint   Patient presents with     Ankle Pain     HPI  Lawrence Engel is a 26 year old male who presents for evaluation of her right ankle injury that occurred just prior to arrival while at work.  He states that he was coming down off of a ladder when his right ankle turned inwards.  Inversion injury.  Pain laterally.  Able to walk, but walks with a slight limp.  Reports his pain 3-4 on a scale of 10.  Sharp with changing direction.  Has not taken anything for the pain.  No prior problems with his ankle.  Previously healthy.        Allergies:  No Known Allergies    Problem List:    There are no problems to display for this patient.       Past Medical History:    History reviewed. No pertinent past medical history.    Past Surgical History:    History reviewed. No pertinent surgical history.    Family History:    Family History   Problem Relation Age of Onset     Diabetes No family hx of      Coronary Artery Disease No family hx of      Hypertension No family hx of      Hyperlipidemia No family hx of      Breast Cancer No family hx of      Cancer - colorectal No family hx of      Ovarian Cancer No family hx of      Prostate Cancer No family hx of      Depression/Anxiety No family hx of      Cerebrovascular Disease No family hx of      Anesthesia Reaction No family hx of      Thyroid Disease No family hx of      Asthma No family hx of      Osteoporosis No family hx of      Chemical Addiction No family hx of      Known Genetic Syndrome No family hx of        Social History:  Marital Status:  Single [1]  Social History     Tobacco Use     Smoking status: Never Smoker     Smokeless tobacco: Current User     Types: Chew     Tobacco comment: occasional   Vaping Use     Vaping Use: Never used   Substance Use Topics     Alcohol use: Yes     Drug use: No        Medications:    No current outpatient medications on file.        Review of Systems   All other systems reviewed and are negative.      Physical  Exam   BP: 132/84  Pulse: 81  Temp: 98  F (36.7  C)  Resp: 18  Weight: 85.7 kg (189 lb)  SpO2: 98 %      Physical Exam  Vitals reviewed.   Constitutional:       General: He is not in acute distress.     Appearance: He is not diaphoretic.   HENT:      Head: Normocephalic and atraumatic.      Right Ear: External ear normal.      Left Ear: External ear normal.      Nose: Nose normal.      Mouth/Throat:      Pharynx: No oropharyngeal exudate.   Eyes:      General: No scleral icterus.        Right eye: No discharge.         Left eye: No discharge.      Conjunctiva/sclera: Conjunctivae normal.      Pupils: Pupils are equal, round, and reactive to light.   Neck:      Thyroid: No thyromegaly.   Cardiovascular:      Rate and Rhythm: Normal rate and regular rhythm.      Heart sounds: Normal heart sounds. No murmur heard.      Pulmonary:      Effort: Pulmonary effort is normal. No respiratory distress.      Breath sounds: Normal breath sounds. No wheezing or rales.   Chest:      Chest wall: No tenderness.   Abdominal:      General: Bowel sounds are normal. There is no distension.      Palpations: Abdomen is soft. There is no mass.      Tenderness: There is no abdominal tenderness. There is no guarding or rebound.   Musculoskeletal:         General: No deformity. Normal range of motion.      Cervical back: Normal range of motion and neck supple.      Comments: Right ankle with lateral distal fibular tenderness.  No step-off or bruising.  Pain slightly anterior to the distal fibula as well.  Negative drawer sign.  No medial tenderness.  No joint effusion.  Right foot is normal.  Normal range of motion.  No tenderness to palpation throughout the foot.  No step-off or sign of trauma.  Distal pulses 2+.  Sensation intact to light touch.  Right lower leg and knee without tenderness.  Normal range of motion.  No proximal fibular discomfort.   Lymphadenopathy:      Cervical: No cervical adenopathy.   Skin:     General: Skin is warm  and dry.      Capillary Refill: Capillary refill takes less than 2 seconds.      Findings: No erythema or rash.   Neurological:      Mental Status: He is alert and oriented to person, place, and time.      Cranial Nerves: No cranial nerve deficit.   Psychiatric:         Behavior: Behavior normal.         Thought Content: Thought content normal.         ED Course                 Procedures              Critical Care time:  none               Results for orders placed or performed during the hospital encounter of 12/17/21 (from the past 24 hour(s))   XR Ankle Right 3 Views    Narrative    XR ANKLE RIGHT G/E 3 VIEWS 12/17/2021 10:04 AM     HISTORY: ankle pain      Impression    IMPRESSION: Negative exam.    TYLER GANDHI MD         SYSTEM ID:  [id number]       Medications - No data to display    Assessments & Plan (with Medical Decision Making)  Right ankle sprain     26 year old male presents for evaluation of a work injury.  Slipped coming down off of a ladder.  Lateral ankle pain.  Pain rated 3-4 on a scale of 10.  Able to ambulate.  Has not taken anything for pain.  See HPI for details.  On exam, vital signs are normal.  He has lateral distal fibular tenderness.  No step-off or bruising.  Remainder the exam is otherwise reassuring and normal.  X-ray negative for fracture.  Ace bandage and an air stirrup applied.  He was able to ambulate without issues.  I offered a note for work, but he states he did not need 1.  Rest, ice, compression, elevation discussed.  OTC ibuprofen 600 mg every 6 hours as needed for pain.  Follow-up in 1 week if not improving.  Home conservative care measures reviewed.  Patient was in agreement this plan was suitable for discharge.     I have reviewed the nursing notes.    I have reviewed the findings, diagnosis, plan and need for follow up with the patient.       There are no discharge medications for this patient.      Final diagnoses:   Right ankle sprain     Disclaimer: This note consists  of symbols derived from keyboarding, dictation and/or voice recognition software. As a result, there may be errors in the script that have gone undetected. Please consider this when interpreting information found in this chart.      12/17/2021   [facility] EMERGENCY DEPT     Sanju Garcia PA-C  12/17/21 2020

## 2022-09-11 ENCOUNTER — HEALTH MAINTENANCE LETTER (OUTPATIENT)
Age: 27
End: 2022-09-11

## 2023-01-22 ENCOUNTER — HEALTH MAINTENANCE LETTER (OUTPATIENT)
Age: 28
End: 2023-01-22

## 2024-02-18 ENCOUNTER — HEALTH MAINTENANCE LETTER (OUTPATIENT)
Age: 29
End: 2024-02-18